# Patient Record
Sex: FEMALE | Race: WHITE
[De-identification: names, ages, dates, MRNs, and addresses within clinical notes are randomized per-mention and may not be internally consistent; named-entity substitution may affect disease eponyms.]

---

## 2018-05-30 ENCOUNTER — HOSPITAL ENCOUNTER (OUTPATIENT)
Dept: HOSPITAL 62 - ER | Age: 38
Setting detail: OBSERVATION
LOS: 2 days | Discharge: HOME HEALTH | End: 2018-06-01
Attending: INTERNAL MEDICINE | Admitting: INTERNAL MEDICINE
Payer: OTHER GOVERNMENT

## 2018-05-30 DIAGNOSIS — F10.129: ICD-10-CM

## 2018-05-30 DIAGNOSIS — J45.909: ICD-10-CM

## 2018-05-30 DIAGNOSIS — N39.0: ICD-10-CM

## 2018-05-30 DIAGNOSIS — R45.1: ICD-10-CM

## 2018-05-30 DIAGNOSIS — Z91.5: ICD-10-CM

## 2018-05-30 DIAGNOSIS — E87.2: Primary | ICD-10-CM

## 2018-05-30 DIAGNOSIS — Z79.899: ICD-10-CM
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
## 2018-05-30 LAB
ADD MANUAL DIFF: NO
ALBUMIN SERPL-MCNC: 5.2 G/DL (ref 3.5–5)
ALP SERPL-CCNC: 75 U/L (ref 38–126)
ALT SERPL-CCNC: 52 U/L (ref 9–52)
ANION GAP SERPL CALC-SCNC: 23 MMOL/L (ref 5–19)
APAP SERPL-MCNC: < 10 UG/ML (ref 10–30)
APPEARANCE UR: CLEAR
APTT PPP: YELLOW S
AST SERPL-CCNC: 46 U/L (ref 14–36)
BARBITURATES UR QL SCN: NEGATIVE
BASOPHILS # BLD AUTO: 0.1 10^3/UL (ref 0–0.2)
BASOPHILS NFR BLD AUTO: 0.7 % (ref 0–2)
BILIRUB DIRECT SERPL-MCNC: 0.4 MG/DL (ref 0–0.4)
BILIRUB SERPL-MCNC: 0.8 MG/DL (ref 0.2–1.3)
BILIRUB UR QL STRIP: NEGATIVE
BUN SERPL-MCNC: 15 MG/DL (ref 7–20)
CALCIUM: 9.4 MG/DL (ref 8.4–10.2)
CHLORIDE SERPL-SCNC: 108 MMOL/L (ref 98–107)
CO2 SERPL-SCNC: 14 MMOL/L (ref 22–30)
EOSINOPHIL # BLD AUTO: 0 10^3/UL (ref 0–0.6)
EOSINOPHIL NFR BLD AUTO: 0.5 % (ref 0–6)
ERYTHROCYTE [DISTWIDTH] IN BLOOD BY AUTOMATED COUNT: 13.3 % (ref 11.5–14)
ETHANOL SERPL-MCNC: 318 MG/DL
GLUCOSE SERPL-MCNC: 69 MG/DL (ref 75–110)
GLUCOSE UR STRIP-MCNC: NEGATIVE MG/DL
HCT VFR BLD CALC: 41.5 % (ref 36–47)
HGB BLD-MCNC: 14.4 G/DL (ref 12–15.5)
KETONES UR STRIP-MCNC: (no result) MG/DL
LYMPHOCYTES # BLD AUTO: 2.7 10^3/UL (ref 0.5–4.7)
LYMPHOCYTES NFR BLD AUTO: 28.2 % (ref 13–45)
MCH RBC QN AUTO: 33 PG (ref 27–33.4)
MCHC RBC AUTO-ENTMCNC: 34.6 G/DL (ref 32–36)
MCV RBC AUTO: 96 FL (ref 80–97)
METHADONE UR QL SCN: NEGATIVE
MONOCYTES # BLD AUTO: 0.4 10^3/UL (ref 0.1–1.4)
MONOCYTES NFR BLD AUTO: 4.3 % (ref 3–13)
NEUTROPHILS # BLD AUTO: 6.4 10^3/UL (ref 1.7–8.2)
NEUTS SEG NFR BLD AUTO: 66.3 % (ref 42–78)
NITRITE UR QL STRIP: NEGATIVE
PCP UR QL SCN: NEGATIVE
PH UR STRIP: 5 [PH] (ref 5–9)
PLATELET # BLD: 387 10^3/UL (ref 150–450)
POTASSIUM SERPL-SCNC: 4.3 MMOL/L (ref 3.6–5)
PROT SERPL-MCNC: 8.8 G/DL (ref 6.3–8.2)
PROT UR STRIP-MCNC: 30 MG/DL
RBC # BLD AUTO: 4.35 10^6/UL (ref 3.72–5.28)
SALICYLATES SERPL-MCNC: < 1 MG/DL (ref 2–20)
SODIUM SERPL-SCNC: 145.2 MMOL/L (ref 137–145)
SP GR UR STRIP: 1
TOTAL CELLS COUNTED % (AUTO): 100 %
URINE AMPHETAMINES SCREEN: NEGATIVE
URINE BENZODIAZEPINES SCREEN: NEGATIVE
URINE COCAINE SCREEN: NEGATIVE
URINE MARIJUANA (THC) SCREEN: NEGATIVE
UROBILINOGEN UR-MCNC: NEGATIVE MG/DL (ref ?–2)
WBC # BLD AUTO: 9.6 10^3/UL (ref 4–10.5)

## 2018-05-30 PROCEDURE — 85025 COMPLETE CBC W/AUTO DIFF WBC: CPT

## 2018-05-30 PROCEDURE — 80061 LIPID PANEL: CPT

## 2018-05-30 PROCEDURE — 85027 COMPLETE CBC AUTOMATED: CPT

## 2018-05-30 PROCEDURE — 80307 DRUG TEST PRSMV CHEM ANLYZR: CPT

## 2018-05-30 PROCEDURE — 93010 ELECTROCARDIOGRAM REPORT: CPT

## 2018-05-30 PROCEDURE — 99285 EMERGENCY DEPT VISIT HI MDM: CPT

## 2018-05-30 PROCEDURE — 87086 URINE CULTURE/COLONY COUNT: CPT

## 2018-05-30 PROCEDURE — 87088 URINE BACTERIA CULTURE: CPT

## 2018-05-30 PROCEDURE — 81001 URINALYSIS AUTO W/SCOPE: CPT

## 2018-05-30 PROCEDURE — 93005 ELECTROCARDIOGRAM TRACING: CPT

## 2018-05-30 PROCEDURE — 84100 ASSAY OF PHOSPHORUS: CPT

## 2018-05-30 PROCEDURE — 84703 CHORIONIC GONADOTROPIN ASSAY: CPT

## 2018-05-30 PROCEDURE — 80053 COMPREHEN METABOLIC PANEL: CPT

## 2018-05-30 PROCEDURE — G0378 HOSPITAL OBSERVATION PER HR: HCPCS

## 2018-05-30 PROCEDURE — 83735 ASSAY OF MAGNESIUM: CPT

## 2018-05-30 PROCEDURE — 36415 COLL VENOUS BLD VENIPUNCTURE: CPT

## 2018-05-30 PROCEDURE — 82803 BLOOD GASES ANY COMBINATION: CPT

## 2018-05-30 PROCEDURE — 87186 SC STD MICRODIL/AGAR DIL: CPT

## 2018-05-30 PROCEDURE — 84443 ASSAY THYROID STIM HORMONE: CPT

## 2018-05-30 NOTE — EKG REPORT
SEVERITY:- BORDERLINE ECG -

SINUS RHYTHM

BORDERLINE PROLONGED QT INTERVAL

:

Confirmed by: Nguyen Gonzalez 30-May-2018 23:09:03

## 2018-05-30 NOTE — ER DOCUMENT REPORT
ED General





- General


Mode of Arrival: Ambulatory


Information source: Patient, Law Enforcement


TRAVEL OUTSIDE OF THE [place] IN LAST 30 DAYS: No





<ARNAUD LAY - Last Filed: 18 22:14>





<CANDI ALEJO - Last Filed: 18 00:52>





<SUE VIRK - Last Filed: 18 10:05>





<SCARLETT HAMM - Last Filed: 18 12:33>





- General


Chief Complaint: Psych Problem


Stated Complaint: IVC WITH PAPERS


Time Seen by Provider: 18 21:22


Notes: 


Patient is a 37 year old female with asthma brought into the emergency 

department via [employer] on IVC papers. Patient states she is here because of her 

. According to IVC brought in by law enforcement, patient's  

states she has been sleeping all day and is not capable to take care of her 

kids. Patient denies this statement stating her  was not home today, and 

when he did come home she was up with her son. Patient denies drinking alcohol 

tonight, suicidal ideation or homicidal ideation. 





Patient currently takes Allegra, Advair and Albuterol. Patient states she 

normally does not have to take albuterol when taking Advair but states her 

 brought one of her cats into her home so she feels her breathing has 

been tighter lately.


 (ARNAUD LAY)











Patient comes in with IVC paperwork, EtOH level is 318 document, denies 

drinking at all today.  Spouse called and provided history to the nurse.


Patient has long history of alcohol abuse.  Today is day 6 of an alcohol binge.

  Unable to care for her children.  Passes out at home while she is watching 

the children.  Multiple suicide attempts in the past. (CANDI ALEJO)





- Related Data


Allergies/Adverse Reactions: 


 





No Known Allergies Allergy (Unverified 18 21:26)


 











Past Medical History





- General


Information source: Patient





- Social History


Smoking Status: Never Smoker


Chew tobacco use (# tins/day): No


Frequency of alcohol use: Heavy


Drug Abuse: None


Occupation: Stay at home mom


Patient has suicidal ideation: No


Patient has homicidal ideation: No





- Past Medical History


Cardiac Medical History: Reports: Hx Hypertension


Pulmonary Medical History: Reports: Hx Asthma


Past Surgical History: Reports: Hx  Section - x2





<ARNAUD LAY - Last Filed: 18 22:14>





- Social History


Family History: None





<SCARLETT HAMM - Last Filed: 18 12:33>





Review of Systems





- Review of Systems


Constitutional: No symptoms reported


EENT: No symptoms reported


Cardiovascular: No symptoms reported


Respiratory: No symptoms reported


Gastrointestinal: No symptoms reported


Genitourinary: No symptoms reported


Female Genitourinary: No symptoms reported


Musculoskeletal: No symptoms reported


Skin: No symptoms reported


Hematologic/Lymphatic: No symptoms reported


Neurological/Psychological: No symptoms reported


-: Yes All other systems reviewed and negative





<ROSANNEDICKGIL - Last Filed: 18 22:14>





Physical Exam





<ARNAUD LAY - Last Filed: 18 22:14>





<CANDI ALEJO - Last Filed: 18 00:52>





<SUE VIRK - Last Filed: 18 10:05>





<SCARLETT HAMM - Last Filed: 18 12:33>





- Vital signs


Vitals: 





 











Temp Pulse Resp BP Pulse Ox


 


 98.4 F   128 H  20   150/107 H  100 


 


 18 21:22  18 21:22  18 21:22  18 21:22  18 21:22














- Notes


Notes: 


GENERAL: Alert, interacts well. No acute distress. Strong alcohol odor. 


HEAD: Normocephalic, atraumatic.


EYES: Pupils equal, round, and reactive to light. Extraocular movements intact.


ENT: Oral mucosa moist, tongue midline. 


NECK: Full range of motion. Supple. Trachea midline.


LUNGS: Wheezing with cough. No rales or rhonchi. No respiratory distress.


HEART: Regular rate and rhythm. No murmurs, gallops, or rubs.


EXTREMITIES: Moves all 4 extremities spontaneously. 


NEUROLOGICAL: Alert and oriented x3. Normal speech. 


PSYCH: Normal affect, normal mood.


SKIN: Warm, dry, normal turgor. No rashes or lesions noted.





 (ARNAUD LAY)





Course





- Laboratory


Result Diagrams: 


 18 21:05





 18 21:05





<ARNAUD LAY - Last Filed: 18 22:14>





- Laboratory


Result Diagrams: 


 18 21:05





 18 21:05





- EKG Interpretation by Me


EKG shows normal: Sinus rhythm, Axis, QRS Complexes, ST-T Waves.  abnormal: 

Intervals - Borderline prolonged QT interval


Rate: Normal - 98


Rhythm: NSR





- Transfer of Care


Care transferred to following provider: Dr. Harris





<CANDI ALEJO - Last Filed: 18 00:52>





- Laboratory


Result Diagrams: 


 18 21:05





 18 21:05





<SUE VIRK - Last Filed: 18 10:05>





- Laboratory


Result Diagrams: 


 18 21:05





 18 11:10





<SCARLETT HAMM - Last Filed: 18 12:33>





- Re-evaluation


Re-evalutation: 





18 00:52


Patient's agitated behavior calmed down after the Haldol and Benadryl. (CANDI ALEJO)





- Vital Signs


Vital signs: 





 











Temp Pulse Resp BP Pulse Ox


 


 98.9 F   102 H  16   147/98 H  100 


 


 18 10:45  18 10:45  18 10:45  18 10:45  18 10:45














- Laboratory


Laboratory results interpreted by me: 





 











  18





  21:05 22:33 11:10


 


Sodium  145.2 H  


 


Chloride  108 H  


 


Carbon Dioxide  14 L   13 L


 


Anion Gap  23 H   25 H


 


Glucose  69 L   59 L


 


Total Bilirubin    1.9 H


 


Direct Bilirubin    0.5 H


 


AST  46 H   53 H


 


Total Protein  8.8 H   8.7 H


 


Albumin  5.2 H   5.2 H


 


Urine Protein   30 H 


 


Urine Ketones   TRACE H 


 


Urine Blood   MODERATE H 


 


Ur Leukocyte Esterase   SMALL H 


 


Salicylates  < 1.0 L  


 


Acetaminophen  < 10 L  


 


Serum Alcohol  318 H*  














- Transfer of Care


Notes: 





18 00:53


Patient is pending evaluation by psychiatry in the morning.  She is on IVC 

paperwork.


She may need additional doses of Haldol and/or Benadryl for agitation. (CANDI ALEJO)





Discharge





<ARNAUD LAY - Last Filed: 18 22:14>





<CANDI ALEJO - Last Filed: 18 00:52>





<SUE VIRK - Last Filed: 18 10:05>





- Discharge


Admitting Provider: Hospitalist - Edel


Unit Admitted: IMCU





<SCARLETT HAMM - Last Filed: 18 12:33>





- Discharge


Clinical Impression: 


 Chronic alcohol abuse, Alcoholic ketoacidosis





Acute alcohol intoxication


Qualifiers:


 Complication of substance-induced condition: uncomplicated Qualified Code(s): 

F10.929 - Alcohol use, unspecified with intoxication, unspecified





Urinary tract infection


Qualifiers:


 Urinary tract infection type: site unspecified Hematuria presence: without 

hematuria Qualified Code(s): N39.0 - Urinary tract infection, site not specified





Condition: Fair


Disposition: ADMITTED AS INPATIENT


Additional Instructions: 


ACUTE ALCOHOL INTOXICATION and ALCOHOL ABUSE:





     Your evaluation revealed very high levels of alcohol.  You can die from 

drinking a large amount of alcohol rapidly!  Further, there's the risk of falls

, traffic accidents, and fights.  A high portion (about 50 percent) of the 

serious injuries seen in hospital emergency rooms are caused by alcohol.


     Alcohol overdosage is usually due to an underlying emotional or 

psychiatric problem.  You may benefit from counselling.


     If "binge" drinking is an ongoing problem for you, or if you drink ANY 

AMOUNT of alcohol EVERY day, you most likely have a tendency to alcoholism.  

You should avoid alcohol totally.  We can refer you for treatment.  Persons 

with alcohol problems are often also prone to other addictions -- you should 

discuss any use of medications or drugs with the doctor.


     You should be watched at home for the next several hours by someone who 

has not been drinking. Get extra fluids for the next 24 hours.  Call the doctor 

if there is repeated vomiting, increasing headache, decreasing level of 

alertness, or any other worsening.








CHRONIC ALCOHOLISM and ALCOHOL ABUSE:





     Your evaluation reveals evidence of chronic alcoholism, an addiction to 

alcohol.  The tendency to alcoholism may be inherited.


     Chronic use of alcohol weakens muscles, causes fatty deposits in the liver

, damages the stomach, makes you more prone to infections, and can cause birth 

defects in unborn children.  In the long run, brain atrophy and cirrhosis of 

the liver result.  You are also at greater risk for certain types of cancer, 

such as cancer of the mouth, throat, stomach, and liver.


     Counselling services are available to help you.  In-hospital treatment 

programs often help.  Support groups such as Alcoholics Anonymous can be very 

useful in beating this addiction.  Your physician can make a referral for you.


     As alcoholics often are prone to other addictions, you should discuss your 

use of any other medications with the doctor.








ALCOHOL WITHDRAWAL:





     Your symptoms are caused by alcohol withdrawal. After a period of frequent 

drinking, the brain and body are changed by the alcohol. When you quit or 

reduce your drinking, the nervous system becomes unstable. Withdrawal symptoms 

can start a few hours after your last drink, but sometimes don't begin until a 

couple of days later. Symptoms can include shakiness, sweating, insomnia, nausea

, vomiting, fearfulness, hallucinations, and seizures.


     In addition to the acute effects of alcohol withdrawal, we often have to 

deal with the medical effects of alcoholism. These problems often include 

dehydration, stomach irritation, intestinal bleeding, low blood sugar, liver 

disease, and pancreas inflammation.


     Treatment for alcohol withdrawal includes mild sedatives, vitamins, and 

fluids. You need to be with someone who can help if symptoms become severe. 

Many patients can withdraw at home. Admission to the hospital or a detox 

facility may be necessary if withdrawal symptoms are severe and uncontrollable.


     Abstaining from alcohol is the only effective long-term treatment. If you 

start drinking again, you will not be able to control yourself after the first 

drink. Treatment programs are available. In addition, many alcoholics benefit 

from Alcoholics Anonymous or other support groups available through your 

counselor or [employer] . AL-ANON and A[employer]TE[facility] are support groups for 

friends and family members of an alcoholic.


     Go to the emergency room if you develop persistent vomiting, severe 

abdominal pain, fever, shortness of breath, hallucinations, uncontrollable 

tremors, or seizures.








FOLLOW-UP CARE:


Please [place] 

[place] for your continued services.  You are encouraged to follow up with 

substance abuse treatment; you have been provided a detox list of area 

providers.  If you experience worsening or a significant change in your symptoms

, notify the physician immediately or return to the Emergency Department at any 

time for re-evaluation.


Referrals: 


TWYLA ANGELA DO [Primary Care Provider] - Follow up as needed


Luis Attestation: 





18 22:56


I personally performed the services described in the documentation, reviewed 

and edited the documentation which was dictated to the scribe in my presence, 

and it accurately records my words and actions. (CANDI ALEJO)





Scribe Documentation





- Scribe


Written by Luis:: Luis Burton, 2018 22:09


acting as scribe for :: Arnaldo





<ARNAUD LAY - Last Filed: 18 22:14>

## 2018-05-31 LAB
ALBUMIN SERPL-MCNC: 5.2 G/DL (ref 3.5–5)
ALP SERPL-CCNC: 77 U/L (ref 38–126)
ALT SERPL-CCNC: 52 U/L (ref 9–52)
ANION GAP SERPL CALC-SCNC: 25 MMOL/L (ref 5–19)
AST SERPL-CCNC: 53 U/L (ref 14–36)
BASE EXCESS BLDV CALC-SCNC: -8.7 MMOL/L
BILIRUB DIRECT SERPL-MCNC: 0.5 MG/DL (ref 0–0.4)
BILIRUB SERPL-MCNC: 1.9 MG/DL (ref 0.2–1.3)
BUN SERPL-MCNC: 12 MG/DL (ref 7–20)
CALCIUM: 9.4 MG/DL (ref 8.4–10.2)
CHLORIDE SERPL-SCNC: 102 MMOL/L (ref 98–107)
CO2 SERPL-SCNC: 13 MMOL/L (ref 22–30)
GLUCOSE SERPL-MCNC: 59 MG/DL (ref 75–110)
HCO3 BLDV-SCNC: 16.3 MMOL/L (ref 20–32)
PCO2 BLDV: 32.6 MMHG (ref 35–63)
PH BLDV: 7.32 [PH] (ref 7.3–7.42)
POTASSIUM SERPL-SCNC: 4.6 MMOL/L (ref 3.6–5)
PROT SERPL-MCNC: 8.7 G/DL (ref 6.3–8.2)
SODIUM SERPL-SCNC: 139.5 MMOL/L (ref 137–145)

## 2018-05-31 RX ADMIN — SODIUM CHLORIDE, SODIUM LACTATE, POTASSIUM CHLORIDE, AND CALCIUM CHLORIDE PRN ML: .6; .31; .03; .02 INJECTION, SOLUTION INTRAVENOUS at 15:45

## 2018-05-31 RX ADMIN — CEPHALEXIN SCH MG: 500 CAPSULE ORAL at 11:02

## 2018-05-31 RX ADMIN — CEPHALEXIN SCH MG: 500 CAPSULE ORAL at 17:15

## 2018-05-31 NOTE — ER DOCUMENT REPORT
Doctor's Note


Notes: 





05/31/18 10:41


Talked with patient about why she is here in the ED. She reports that her 

 brought her here because he thinks that she drinks too much. Pt admits 

that she drinks a "significant amount" of alcohol on the weekends and denies 

drinking alcohol during the weekdays. She reports that she [place] 

[place] and states that she is doing well 

under his counseling. Pt denies ever having tremors, seizures or hallucinations 

as withdrawals but admits that she has had sweats past. Pt is not okay with 

someone talking to her counselor before she can talk to him and wants to speak 

with her . Pt denies every trying to herself or her children, 8 y.o 

twins and a 3 y.o, and she believes that her children are safe at home. Pt does 

not understand why she is being kept here and thinks that she is safe to go 

home.





PE: 





LUNGS: Clear to auscultation bilaterally, no wheezes, rales, or rhonchi. No 

respiratory distress.





HEART: Regular rate and rhythm. No murmurs, gallops, or rubs.





 (ALEX HUA)








05/31/18 12:34


Labs reviewed, last needs laboratory studies are concerning for possible 

alcoholic ketoacidosis however she has been eating and drinking fluids all 

night long without difficulty so they were rechecked, this morning's labs show 

continuing low CO2 of 13 and increasing anion gap of 25, patient does have 

elevated total and direct bilirubin despite no abdominal pain and no vomiting, 

no tenderness on examination.  Patient was cleared from a mental health 

standpoint, IVC is rescinded, discussed with internal medicine Dr. Walsh for 

admission for alcoholic ketoacidosis.  Thiamine, D5 half-normal saline and 

multivitamins were started.  Patient understands why she is being admitted.  

Also patient's urinary tract infection has been treated with Rocephin. (SCARLETT HAMM)
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
## 2018-06-01 VITALS — SYSTOLIC BLOOD PRESSURE: 148 MMHG | DIASTOLIC BLOOD PRESSURE: 98 MMHG

## 2018-06-01 LAB
ALBUMIN SERPL-MCNC: 4.2 G/DL (ref 3.5–5)
ALP SERPL-CCNC: 60 U/L (ref 38–126)
ALT SERPL-CCNC: 42 U/L (ref 9–52)
ANION GAP SERPL CALC-SCNC: 13 MMOL/L (ref 5–19)
AST SERPL-CCNC: 46 U/L (ref 14–36)
BILIRUB DIRECT SERPL-MCNC: 0.4 MG/DL (ref 0–0.4)
BILIRUB SERPL-MCNC: 1.5 MG/DL (ref 0.2–1.3)
BUN SERPL-MCNC: 8 MG/DL (ref 7–20)
CALCIUM: 8.8 MG/DL (ref 8.4–10.2)
CHLORIDE SERPL-SCNC: 105 MMOL/L (ref 98–107)
CHOLEST SERPL-MCNC: 199.46 MG/DL (ref 0–200)
CO2 SERPL-SCNC: 22 MMOL/L (ref 22–30)
ERYTHROCYTE [DISTWIDTH] IN BLOOD BY AUTOMATED COUNT: 13 % (ref 11.5–14)
GLUCOSE SERPL-MCNC: 79 MG/DL (ref 75–110)
HCT VFR BLD CALC: 35.5 % (ref 36–47)
HGB BLD-MCNC: 12.3 G/DL (ref 12–15.5)
LDLC SERPL DIRECT ASSAY-MCNC: 81 MG/DL (ref ?–100)
MCH RBC QN AUTO: 33 PG (ref 27–33.4)
MCHC RBC AUTO-ENTMCNC: 34.8 G/DL (ref 32–36)
MCV RBC AUTO: 95 FL (ref 80–97)
PHOSPHATE SERPL-MCNC: 2.2 MG/DL (ref 2.5–4.5)
PLATELET # BLD: 247 10^3/UL (ref 150–450)
POTASSIUM SERPL-SCNC: 4.1 MMOL/L (ref 3.6–5)
PROT SERPL-MCNC: 7.1 G/DL (ref 6.3–8.2)
RBC # BLD AUTO: 3.74 10^6/UL (ref 3.72–5.28)
SODIUM SERPL-SCNC: 140.3 MMOL/L (ref 137–145)
TRIGL SERPL-MCNC: 483 MG/DL (ref ?–150)
WBC # BLD AUTO: 6.4 10^3/UL (ref 4–10.5)

## 2018-06-01 RX ADMIN — SODIUM CHLORIDE, SODIUM LACTATE, POTASSIUM CHLORIDE, AND CALCIUM CHLORIDE PRN ML: .6; .31; .03; .02 INJECTION, SOLUTION INTRAVENOUS at 02:41

## 2018-06-01 RX ADMIN — CEPHALEXIN SCH MG: 500 CAPSULE ORAL at 09:25
